# Patient Record
Sex: FEMALE | URBAN - METROPOLITAN AREA
[De-identification: names, ages, dates, MRNs, and addresses within clinical notes are randomized per-mention and may not be internally consistent; named-entity substitution may affect disease eponyms.]

---

## 2024-06-28 ENCOUNTER — APPOINTMENT (RX ONLY)
Dept: URBAN - METROPOLITAN AREA CLINIC 77 | Facility: CLINIC | Age: 64
Setting detail: DERMATOLOGY
End: 2024-06-28

## 2024-06-28 DIAGNOSIS — L82.1 OTHER SEBORRHEIC KERATOSIS: ICD-10-CM

## 2024-06-28 DIAGNOSIS — L85.3 XEROSIS CUTIS: ICD-10-CM

## 2024-06-28 DIAGNOSIS — D485 NEOPLASM OF UNCERTAIN BEHAVIOR OF SKIN: ICD-10-CM

## 2024-06-28 PROBLEM — D48.5 NEOPLASM OF UNCERTAIN BEHAVIOR OF SKIN: Status: ACTIVE | Noted: 2024-06-28

## 2024-06-28 PROCEDURE — ? COUNSELING

## 2024-06-28 PROCEDURE — 99203 OFFICE O/P NEW LOW 30 MIN: CPT | Mod: 25

## 2024-06-28 PROCEDURE — 11102 TANGNTL BX SKIN SINGLE LES: CPT

## 2024-06-28 PROCEDURE — ? BIOPSY BY SHAVE METHOD

## 2024-06-28 ASSESSMENT — LOCATION DETAILED DESCRIPTION DERM: LOCATION DETAILED: RIGHT DISTAL DORSAL FOREARM

## 2024-06-28 ASSESSMENT — LOCATION SIMPLE DESCRIPTION DERM: LOCATION SIMPLE: RIGHT FOREARM

## 2024-06-28 ASSESSMENT — LOCATION ZONE DERM: LOCATION ZONE: ARM

## 2024-06-28 NOTE — PROCEDURE: BIOPSY BY SHAVE METHOD
Billing Type: Third-Party Bill
Bill For Surgical Tray: no
Expected Date Of Service: 08/22/2017
Body Location Override (Optional - Billing Will Still Be Based On Selected Body Map Location If Applicable): right distal radial forearm
Detail Level: Detailed
Depth Of Biopsy: dermis
Was A Bandage Applied: Yes
Size Of Lesion In Cm: 0
X Size Of Lesion In Cm: 0.4
Biopsy Type: H and E
Biopsy Method: Dermablade
Anesthesia Type: 1% lidocaine without epinephrine
Anesthesia Volume In Cc: 0.5
Hemostasis: Drysol
Wound Care: Petrolatum
Dressing: bandage
Destruction After The Procedure: No
Type Of Destruction Used: Curettage
Curettage Text: The wound bed was treated with curettage after the biopsy was performed.
Cryotherapy Text: The wound bed was treated with cryotherapy after the biopsy was performed.
Electrodesiccation Text: The wound bed was treated with electrodesiccation after the biopsy was performed.
Electrodesiccation And Curettage Text: The wound bed was treated with electrodesiccation and curettage after the biopsy was performed.
Silver Nitrate Text: The wound bed was treated with silver nitrate after the biopsy was performed.
Lab: [id number]
Lab Facility: [id number]
Path Notes (To The Dermatopathologist): R/O: DN vs MM vs ISK\\nSize: 0.4cm
Consent: Written consent was obtained and risks were reviewed including but not limited to scarring, infection, bleeding, scabbing, incomplete removal, nerve damage and allergy to anesthesia.
Post-Care Instructions: I reviewed with the patient in detail post-care instructions. Patient is to keep the biopsy site dry overnight, and then apply bacitracin twice daily until healed. Patient may apply hydrogen peroxide soaks to remove any crusting.
Notification Instructions: Patient will be notified of biopsy results. However, patient instructed to call the office if not contacted within 2 weeks.
Billing Type: Third-Party Bill
Information: Selecting Yes will display possible errors in your note based on the variables you have selected. This validation is only offered as a suggestion for you. PLEASE NOTE THAT THE VALIDATION TEXT WILL BE REMOVED WHEN YOU FINALIZE YOUR NOTE. IF YOU WANT TO FAX A PRELIMINARY NOTE YOU WILL NEED TO TOGGLE THIS TO 'NO' IF YOU DO NOT WANT IT IN YOUR FAXED NOTE.

## 2024-06-28 NOTE — PROCEDURE: MIPS QUALITY
Detail Level: Detailed
Quality 431: Preventive Care And Screening: Unhealthy Alcohol Use - Screening: Patient not identified as an unhealthy alcohol user when screened for unhealthy alcohol use using a systematic screening method
Quality 226: Preventive Care And Screening: Tobacco Use: Screening And Cessation Intervention: Patient screened for tobacco use and is an ex/non-smoker
Quality 130: Documentation Of Current Medications In The Medical Record: Current Medications with Name, Dosage, Frequency, or Route not Documented, Reason not Given
yes

## 2025-02-19 ENCOUNTER — HOSPITAL ENCOUNTER (OUTPATIENT)
Dept: HOSPITAL 104 - CDIM | Age: 65
Discharge: HOME | End: 2025-02-19
Payer: COMMERCIAL

## 2025-02-19 DIAGNOSIS — R92.1: ICD-10-CM

## 2025-02-19 DIAGNOSIS — M85.88: ICD-10-CM

## 2025-02-19 DIAGNOSIS — Z13.820: ICD-10-CM

## 2025-02-19 DIAGNOSIS — R92.323: ICD-10-CM

## 2025-02-19 DIAGNOSIS — Z12.31: Primary | ICD-10-CM

## 2025-02-19 DIAGNOSIS — M81.0: ICD-10-CM

## 2025-02-19 PROCEDURE — 77067 SCR MAMMO BI INCL CAD: CPT

## 2025-02-19 PROCEDURE — 77063 BREAST TOMOSYNTHESIS BI: CPT

## 2025-02-19 PROCEDURE — 77080 DXA BONE DENSITY AXIAL: CPT

## 2025-02-19 NOTE — XR_ITS
Examination: Bone densitometry  
   
Date and time of exam:February 19, 2025 1257 hours  
   
INDICATIONS: Menopause age 47  
   
Technique:  
   
Lumbar spine and hip total bone mineralization values of an calculated.  
Peak reference and age match control results have been displayed.  
   
Findings:  
   
Lumbar spine total bone mineralization is0.900 gm/cm2.  
This is 1.3 standard deviations below peak reference.  
This is 0.4 standard deviations above age-matched controls.  
   
Hip total bone mineralization is 0.770 gm/cm2  
This is 1.4 standard deviations below peak reference.  
This is 0.2 standard deviations below age-matched controls  
   
Impression:  
   
There is osteopenia based on lumbar spine measurements.  
There is osteoporosis based on hip measurements

## 2025-02-19 NOTE — XR_ITS
Examination:   
   
Screening digital mammography, bilateral  
Computer aided detection  
3-D breast Tomosynthesis, bilateral  
   
Date and time of exam: February 19, 2025 1241 hours  
Compared to mammograms dating to August 28, 2018  
   
Indication: Screening  
   
Technique:  
Nonmagnified MLO, CC views of the breasts to been obtained, reconstructed from  
3-D Tomosynthesis images.  
R2 computer aided detection program utilized for evaluation of suspicious masses  
and/or abnormal calcifications.  
3-D Tomosynthesis images obtained.  
   
   
Findings:  
   
Scattered areas of fibroglandular density.  
Benign calcifications.  
No interval suspicious masses  
   
Impression:  
   
BI-RADS category II: Benign Findings.  
Recommend 1 year follow-up mammogram.